# Patient Record
Sex: MALE | Race: WHITE
[De-identification: names, ages, dates, MRNs, and addresses within clinical notes are randomized per-mention and may not be internally consistent; named-entity substitution may affect disease eponyms.]

---

## 2020-08-19 ENCOUNTER — HOSPITAL ENCOUNTER (EMERGENCY)
Dept: HOSPITAL 93 - ER | Age: 40
Discharge: HOME | End: 2020-08-19
Payer: COMMERCIAL

## 2020-08-19 VITALS — HEIGHT: 68 IN | BODY MASS INDEX: 28.79 KG/M2 | WEIGHT: 190 LBS

## 2020-08-19 DIAGNOSIS — Z20.828: ICD-10-CM

## 2020-08-19 DIAGNOSIS — J11.1: Primary | ICD-10-CM

## 2020-08-19 DIAGNOSIS — R50.9: ICD-10-CM

## 2020-10-06 ENCOUNTER — HOSPITAL ENCOUNTER (INPATIENT)
Dept: HOSPITAL 93 - ER | Age: 40
LOS: 7 days | Discharge: HOME | DRG: 603 | End: 2020-10-13
Attending: INTERNAL MEDICINE | Admitting: INTERNAL MEDICINE
Payer: COMMERCIAL

## 2020-10-06 VITALS — HEIGHT: 68 IN | WEIGHT: 185 LBS | BODY MASS INDEX: 28.04 KG/M2

## 2020-10-06 DIAGNOSIS — L03.011: Primary | ICD-10-CM

## 2020-10-06 DIAGNOSIS — B95.61: ICD-10-CM

## 2020-10-06 DIAGNOSIS — Z20.828: ICD-10-CM

## 2020-10-06 DIAGNOSIS — L02.511: ICD-10-CM

## 2020-10-06 PROCEDURE — 73218 MRI UPPER EXTREMITY W/O DYE: CPT

## 2020-10-06 NOTE — NUR
SE RECIBE MASCULINO DE 42AN[address]ERT[address] Y CONCIENTE POR SCOTT DIMENSIONES. PTE
REFIERE TENER CELULITIS EN MARIE MANO DERECHA DESDE HACE MONROE SEMANA. SE TIFFANI S/V
Y SE PONE EN AREA DE OBSERVACION.

## 2020-10-06 NOTE — NUR
PTE ES EVALUADO POR DRA. WATTERS QUIEN ORDENA TRATAMIENTO. SE EDUCA A PTE SOBRE
ORDENES MEDICAS Y PTE REFIERE COMPRENDER. SE CANALIZA A PTE EN MANO L+ Y SE
COLECTAN MUESTRAS DE LABORATORIO BAJO MEDIDAS ASEPTICAS. SE KAPIL CUIDADO
LOCAL A PTE EN DEDO JHONATAN DE MANO R+. SE ADMINISTRAN MEDICAMENTOS JOSE C ORDEN
MEDICA LOS CUALES PTE NO PRESENTA REACCION ADVERSA AL MOMENTO. XRAYS REALIZADO.

## 2020-10-07 PROCEDURE — BP3CY0Z MAGNETIC RESONANCE IMAGING (MRI) OF RIGHT HAND/FINGER JOINT USING OTHER CONTRAST, UNENHANCED AND ENHANCED: ICD-10-PCS | Performed by: PEDIATRICS

## 2020-10-07 PROCEDURE — BP3CZZZ MAGNETIC RESONANCE IMAGING (MRI) OF RIGHT HAND/FINGER JOINT: ICD-10-PCS | Performed by: PEDIATRICS
